# Patient Record
Sex: MALE | NOT HISPANIC OR LATINO | URBAN - METROPOLITAN AREA
[De-identification: names, ages, dates, MRNs, and addresses within clinical notes are randomized per-mention and may not be internally consistent; named-entity substitution may affect disease eponyms.]

---

## 2017-12-27 ENCOUNTER — IMPORTED ENCOUNTER (OUTPATIENT)
Dept: URBAN - METROPOLITAN AREA CLINIC 31 | Facility: CLINIC | Age: 22
End: 2017-12-27

## 2017-12-27 PROCEDURE — S0621 ROUTINE OPHTHALMOLOGICAL EXA: HCPCS

## 2017-12-27 NOTE — PATIENT DISCUSSION
1.  Refractive error - Glasses change optional. 2.  Return for an appointment in 1 year for comprehensive exam. with Dr. Luther Honeycutt.

## 2019-04-19 NOTE — PATIENT DISCUSSION
(H40.033) Anatomical narrow angle, bilateral - Assesment : Examination revealed Narrow Angles OU. Gonio performed. Pt referred by Dr. Alex Rondon for eval and possible LPI's. Pt lives in the [facility] and leaving in May to return to [facility] and wishes to proceed with LPI's here before returns. - Plan : Explained condition and risk of angle closure attack. Discussed symptoms of Angles Closure attack and advised to call immediately if any symptoms occur. Recommended LPI OU to protect from NA attack. LPI R/B/A's discussed and all questions answered. Handout given. Pt wishes to proceed. Advised Pt to avoid any medications with glaucoma warning until after proceeding with LPI OU. Advised Pt will take Lotemax Gel qid in PO eye for 4 days then stop. Pt given samples. Schedule LPI OD, then OS. Pt will need appt for Gonio s/p 2nd eye LPI.

## 2019-05-03 NOTE — PATIENT DISCUSSION
(H40.033) Anatomical narrow angle, bilateral - Assesment : Examination revealed Narrow Angles OU. s/p temporal peripheral iridotomy OU. Patent OU today. Gonio performed: angles open OU today. - Plan : Pt to continue care in [facility] and follow up regularly to monitor. Continue Lotemax Gel qid in OS for 1 more day then stop.

## 2022-04-02 ASSESSMENT — VISUAL ACUITY
OD_SC: 20/20
OD_SC: J1+
OS_SC: J1+
OS_SC: 20/20

## 2022-04-02 ASSESSMENT — TONOMETRY
OS_IOP_MMHG: 14
OD_IOP_MMHG: 14